# Patient Record
Sex: MALE
[De-identification: names, ages, dates, MRNs, and addresses within clinical notes are randomized per-mention and may not be internally consistent; named-entity substitution may affect disease eponyms.]

---

## 2018-10-10 ENCOUNTER — CHARTING TRANS (OUTPATIENT)
Dept: OTHER | Age: 63
End: 2018-10-10

## 2018-12-08 VITALS — TEMPERATURE: 98 F

## 2021-04-23 ENCOUNTER — HOSPITAL ENCOUNTER (OUTPATIENT)
Age: 66
Discharge: HOME | End: 2021-04-23
Payer: MEDICARE

## 2021-04-23 ENCOUNTER — APPOINTMENT (OUTPATIENT)
Dept: GENERAL RADIOLOGY | Age: 66
End: 2021-04-23
Attending: NURSE PRACTITIONER
Payer: MEDICARE

## 2021-04-23 VITALS
OXYGEN SATURATION: 98 % | HEART RATE: 90 BPM | RESPIRATION RATE: 17 BRPM | DIASTOLIC BLOOD PRESSURE: 67 MMHG | TEMPERATURE: 98 F | SYSTOLIC BLOOD PRESSURE: 145 MMHG

## 2021-04-23 DIAGNOSIS — M54.50 BACK PAIN, LUMBOSACRAL: Primary | ICD-10-CM

## 2021-04-23 PROCEDURE — 99204 OFFICE O/P NEW MOD 45 MIN: CPT | Performed by: NURSE PRACTITIONER

## 2021-04-23 PROCEDURE — 72100 X-RAY EXAM L-S SPINE 2/3 VWS: CPT | Performed by: NURSE PRACTITIONER

## 2021-04-23 RX ORDER — IBUPROFEN 600 MG/1
600 TABLET ORAL EVERY 8 HOURS PRN
Qty: 30 TABLET | Refills: 0 | Status: SHIPPED | OUTPATIENT
Start: 2021-04-23 | End: 2021-04-30

## 2021-04-23 NOTE — ED INITIAL ASSESSMENT (HPI)
Pt here with c/o right lower back pain that started yesterday after trying to left a heavy box. Pain is sharp. Pain does not radiate. Right leg muscles fel tense. Pain is worse with trying to stand or twist. Denies any numbness or tingling.

## 2021-05-16 ENCOUNTER — HOSPITAL ENCOUNTER (OUTPATIENT)
Age: 66
Discharge: HOME | End: 2021-05-16
Payer: MEDICARE

## 2021-05-16 ENCOUNTER — APPOINTMENT (OUTPATIENT)
Dept: GENERAL RADIOLOGY | Age: 66
End: 2021-05-16
Attending: NURSE PRACTITIONER
Payer: MEDICARE

## 2021-05-16 VITALS
HEART RATE: 104 BPM | DIASTOLIC BLOOD PRESSURE: 65 MMHG | RESPIRATION RATE: 17 BRPM | TEMPERATURE: 99 F | SYSTOLIC BLOOD PRESSURE: 115 MMHG | OXYGEN SATURATION: 98 %

## 2021-05-16 DIAGNOSIS — S93.602A SPRAIN OF LEFT FOOT, INITIAL ENCOUNTER: ICD-10-CM

## 2021-05-16 DIAGNOSIS — M79.672 LEFT FOOT PAIN: Primary | ICD-10-CM

## 2021-05-16 PROCEDURE — L3260 AMBULATORY SURGICAL BOOT EAC: HCPCS | Performed by: NURSE PRACTITIONER

## 2021-05-16 PROCEDURE — 99213 OFFICE O/P EST LOW 20 MIN: CPT | Performed by: NURSE PRACTITIONER

## 2021-05-16 PROCEDURE — 73630 X-RAY EXAM OF FOOT: CPT | Performed by: NURSE PRACTITIONER

## 2021-05-16 RX ORDER — TAMSULOSIN HYDROCHLORIDE 0.4 MG/1
CAPSULE ORAL
COMMUNITY
Start: 2020-08-26

## 2021-05-16 RX ORDER — FINASTERIDE 5 MG/1
TABLET, FILM COATED ORAL
COMMUNITY
Start: 2021-04-26

## 2021-05-16 RX ORDER — VERAPAMIL HYDROCHLORIDE 120 MG/1
TABLET, FILM COATED ORAL
COMMUNITY
Start: 2020-09-08

## 2021-05-16 NOTE — ED PROVIDER NOTES
Patient Seen in: [facility]      History   Patient presents with:  Leg or Foot Injury: Entered by patient    Stated Complaint: Leg or Foot Injury    HPI/Subjective:   HPI    This is a 58-year-old male presenting with left foot pain.   Patient of motion. Feet:    Skin:     General: Skin is warm and dry. Capillary Refill: Capillary refill takes less than 2 seconds. Neurological:      General: No focal deficit present.       Mental Status: He is alert and oriented to person, place, an visit in 3 days      [address] [place] [id number]  Schedule an appointment as soon as possible for a visit in 3 days  [facility]          Medications Prescribed:  Discharge Medicatio

## 2021-05-16 NOTE — ED INITIAL ASSESSMENT (HPI)
Pt here with left foot pain, yesterday when he was getting up from the chair  He stepped wrong and possibly sprained the top of his left foot , pt has pain with walking

## 2022-04-14 ENCOUNTER — HOSPITAL ENCOUNTER (OUTPATIENT)
Age: 67
Discharge: HOME | End: 2022-04-14
Payer: MEDICARE

## 2022-04-14 ENCOUNTER — APPOINTMENT (OUTPATIENT)
Dept: GENERAL RADIOLOGY | Age: 67
End: 2022-04-14
Attending: PHYSICIAN ASSISTANT
Payer: MEDICARE

## 2022-04-14 VITALS
RESPIRATION RATE: 20 BRPM | HEART RATE: 72 BPM | OXYGEN SATURATION: 100 % | SYSTOLIC BLOOD PRESSURE: 138 MMHG | TEMPERATURE: 98 F | DIASTOLIC BLOOD PRESSURE: 66 MMHG

## 2022-04-14 DIAGNOSIS — R07.81 RIB PAIN ON RIGHT SIDE: Primary | ICD-10-CM

## 2022-04-14 PROCEDURE — 71046 X-RAY EXAM CHEST 2 VIEWS: CPT | Performed by: PHYSICIAN ASSISTANT

## 2022-04-14 PROCEDURE — 99203 OFFICE O/P NEW LOW 30 MIN: CPT | Performed by: PHYSICIAN ASSISTANT

## 2022-04-14 RX ORDER — LIDOCAINE 50 MG/G
1 PATCH TOPICAL EVERY 24 HOURS
Qty: 10 PATCH | Refills: 0 | Status: SHIPPED | OUTPATIENT
Start: 2022-04-14 | End: 2022-04-24

## 2022-04-14 NOTE — ED INITIAL ASSESSMENT (HPI)
Pt here with right rib pain since last Thursday after reaching down into a garbage can and rubbing ribs against it. Denies SOB.

## 2022-07-10 ENCOUNTER — HOSPITAL ENCOUNTER (OUTPATIENT)
Age: 67
Discharge: HOME | End: 2022-07-10
Payer: MEDICARE

## 2022-07-10 VITALS
DIASTOLIC BLOOD PRESSURE: 74 MMHG | HEART RATE: 69 BPM | SYSTOLIC BLOOD PRESSURE: 132 MMHG | RESPIRATION RATE: 18 BRPM | TEMPERATURE: 97 F | OXYGEN SATURATION: 99 %

## 2022-07-10 DIAGNOSIS — M54.50 ACUTE MIDLINE LOW BACK PAIN WITHOUT SCIATICA: Primary | ICD-10-CM

## 2022-07-10 RX ORDER — NAPROXEN 500 MG/1
500 TABLET ORAL 2 TIMES DAILY PRN
Qty: 20 TABLET | Refills: 0 | Status: SHIPPED | OUTPATIENT
Start: 2022-07-10 | End: 2022-07-20

## 2022-07-10 RX ORDER — TIZANIDINE 2 MG/1
2 TABLET ORAL EVERY 6 HOURS PRN
Qty: 15 TABLET | Refills: 0 | Status: SHIPPED | OUTPATIENT
Start: 2022-07-10

## 2022-07-10 RX ORDER — LIDOCAINE 4 G/G
1 PATCH TOPICAL EVERY 24 HOURS
Qty: 7 PATCH | Refills: 0 | Status: SHIPPED | OUTPATIENT
Start: 2022-07-10 | End: 2022-07-17

## 2022-07-10 NOTE — ED INITIAL ASSESSMENT (HPI)
Pt here with complaints of lower back pain, pt states he bend down yesterday to pick something up and felt a sharp pain across his lower back, pt states he has limited rom